# Patient Record
Sex: MALE | Race: WHITE | NOT HISPANIC OR LATINO
[De-identification: names, ages, dates, MRNs, and addresses within clinical notes are randomized per-mention and may not be internally consistent; named-entity substitution may affect disease eponyms.]

---

## 2017-05-25 PROBLEM — Z00.00 ENCOUNTER FOR PREVENTIVE HEALTH EXAMINATION: Status: ACTIVE | Noted: 2017-05-25

## 2017-06-13 ENCOUNTER — APPOINTMENT (OUTPATIENT)
Dept: HEMATOLOGY ONCOLOGY | Facility: CLINIC | Age: 62
End: 2017-06-13

## 2017-06-13 VITALS
OXYGEN SATURATION: 98 % | RESPIRATION RATE: 16 BRPM | DIASTOLIC BLOOD PRESSURE: 75 MMHG | TEMPERATURE: 98.7 F | HEIGHT: 68.9 IN | BODY MASS INDEX: 26.36 KG/M2 | HEART RATE: 63 BPM | WEIGHT: 178 LBS | SYSTOLIC BLOOD PRESSURE: 105 MMHG

## 2017-06-13 DIAGNOSIS — Z78.9 OTHER SPECIFIED HEALTH STATUS: ICD-10-CM

## 2017-06-13 DIAGNOSIS — Z80.2 FAMILY HISTORY OF MALIGNANT NEOPLASM OF OTHER RESPIRATORY AND INTRATHORACIC ORGANS: ICD-10-CM

## 2017-06-13 DIAGNOSIS — Z80.49 FAMILY HISTORY OF MALIGNANT NEOPLASM OF OTHER GENITAL ORGANS: ICD-10-CM

## 2017-06-13 RX ORDER — ASPIRIN ENTERIC COATED TABLETS 81 MG 81 MG/1
81 TABLET, DELAYED RELEASE ORAL
Refills: 0 | Status: ACTIVE | COMMUNITY

## 2017-06-16 ENCOUNTER — OTHER (OUTPATIENT)
Age: 62
End: 2017-06-16

## 2017-06-16 DIAGNOSIS — Z01.818 ENCOUNTER FOR OTHER PREPROCEDURAL EXAMINATION: ICD-10-CM

## 2017-06-27 ENCOUNTER — OTHER (OUTPATIENT)
Age: 62
End: 2017-06-27

## 2017-07-24 ENCOUNTER — OTHER (OUTPATIENT)
Age: 62
End: 2017-07-24

## 2017-11-09 ENCOUNTER — APPOINTMENT (OUTPATIENT)
Dept: HEMATOLOGY ONCOLOGY | Facility: CLINIC | Age: 62
End: 2017-11-09
Payer: COMMERCIAL

## 2017-11-09 PROCEDURE — 99499A: CUSTOM | Mod: NC

## 2018-03-12 ENCOUNTER — OTHER (OUTPATIENT)
Age: 63
End: 2018-03-12

## 2018-04-05 ENCOUNTER — APPOINTMENT (OUTPATIENT)
Dept: HEMATOLOGY ONCOLOGY | Facility: CLINIC | Age: 63
End: 2018-04-05
Payer: COMMERCIAL

## 2018-04-05 VITALS
SYSTOLIC BLOOD PRESSURE: 132 MMHG | RESPIRATION RATE: 20 BRPM | HEART RATE: 94 BPM | OXYGEN SATURATION: 98 % | TEMPERATURE: 98.5 F | HEIGHT: 68.9 IN | BODY MASS INDEX: 26.66 KG/M2 | DIASTOLIC BLOOD PRESSURE: 82 MMHG | WEIGHT: 179.99 LBS

## 2018-04-05 PROCEDURE — 99214 OFFICE O/P EST MOD 30 MIN: CPT

## 2018-04-05 RX ORDER — MENTHOL/CAMPHOR 0.5 %-0.5%
1000 LOTION (ML) TOPICAL
Refills: 0 | Status: ACTIVE | COMMUNITY

## 2018-04-05 RX ORDER — PSYLLIUM SEED
28.3 PACKET (EA) ORAL
Refills: 0 | Status: ACTIVE | COMMUNITY

## 2018-08-15 ENCOUNTER — OTHER (OUTPATIENT)
Age: 63
End: 2018-08-15

## 2018-10-03 ENCOUNTER — APPOINTMENT (OUTPATIENT)
Dept: HEMATOLOGY ONCOLOGY | Facility: CLINIC | Age: 63
End: 2018-10-03
Payer: COMMERCIAL

## 2018-10-03 ENCOUNTER — RESULT REVIEW (OUTPATIENT)
Age: 63
End: 2018-10-03

## 2018-10-03 VITALS
RESPIRATION RATE: 20 BRPM | HEART RATE: 70 BPM | SYSTOLIC BLOOD PRESSURE: 137 MMHG | BODY MASS INDEX: 27.1 KG/M2 | OXYGEN SATURATION: 98 % | WEIGHT: 182.98 LBS | HEIGHT: 68.9 IN | DIASTOLIC BLOOD PRESSURE: 74 MMHG | TEMPERATURE: 98.7 F

## 2018-10-03 PROCEDURE — 99214 OFFICE O/P EST MOD 30 MIN: CPT

## 2019-06-20 ENCOUNTER — APPOINTMENT (OUTPATIENT)
Dept: HEMATOLOGY ONCOLOGY | Facility: CLINIC | Age: 64
End: 2019-06-20
Payer: COMMERCIAL

## 2019-06-25 ENCOUNTER — RESULT REVIEW (OUTPATIENT)
Age: 64
End: 2019-06-25

## 2019-06-25 ENCOUNTER — APPOINTMENT (OUTPATIENT)
Dept: HEMATOLOGY ONCOLOGY | Facility: CLINIC | Age: 64
End: 2019-06-25
Payer: COMMERCIAL

## 2019-06-25 VITALS
RESPIRATION RATE: 18 BRPM | DIASTOLIC BLOOD PRESSURE: 87 MMHG | OXYGEN SATURATION: 96 % | SYSTOLIC BLOOD PRESSURE: 138 MMHG | HEART RATE: 75 BPM | HEIGHT: 68.94 IN | WEIGHT: 180 LBS | BODY MASS INDEX: 26.66 KG/M2 | TEMPERATURE: 98.9 F

## 2019-06-25 DIAGNOSIS — E88.09 OTHER DISORDERS OF PLASMA-PROTEIN METABOLISM, NOT ELSEWHERE CLASSIFIED: ICD-10-CM

## 2019-06-25 PROCEDURE — 99214 OFFICE O/P EST MOD 30 MIN: CPT

## 2019-06-25 RX ORDER — MULTIVITAMIN
TABLET ORAL
Refills: 0 | Status: COMPLETED | COMMUNITY
End: 2019-06-25

## 2019-06-25 RX ORDER — SENNOSIDES 8.6 MG TABLETS 8.6 MG/1
8.6 TABLET ORAL
Refills: 0 | Status: COMPLETED | COMMUNITY
End: 2019-06-25

## 2019-06-25 NOTE — HISTORY OF PRESENT ILLNESS
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 Pt.  S/P 7 of 12 cycles of FOLFOX has a PMH of sigmoid colon ca who was initially thought to have diverticulitis., and underwent a Tania's procedure with colostomy and then a colostomy takedown at [facility] protected by an ileostomy. He did not complete his chemo regimen due to complications with neuropathy. He underwent Gastrografin enema which showed high grade stricture. Unclear if ileostomy was reversed at this time last records are from 2016. Labs dated 05/11/2017 show Ca 125: 10.5, CEA: 3.1, Ca 19-9: 17, PSA 2.0  Most recent cat scan was in Dec of 2016 was negative.  He also has yearly colonoscopy and rigid scope every six months.\par he reports that he continues to have neuropathy in his upper and lower extremities but it is improved since discontinuing chemotherapy.  he also reports significant hearing loss. [FreeTextEntry1] : s/p Folfox x 3 m [de-identified] : Patient presents today for follow up of colon cancer- states he is feeling well- having colonoscopy next month.  CT scan 12/2018- stable CT since April 2018- no evidence neoplasm or metastatic disease within the abdomen or pelvis.

## 2019-06-25 NOTE — CONSULT LETTER
[FreeTextEntry3] : Lawanda Fuentes MD\par [facility] [place] at [facility]\par

## 2019-06-25 NOTE — DISCUSSION/SUMMARY
[FreeTextEntry1] : Patient called with results of labs, \par 1.communicated the results consistent with monoclonal gammopathy of uncertain significance. Recommended 24-hour urine collection for protein and urine protein electrophoresis and urine for Bence Chaney and skeletal survey.\par 2.noted elevation in CEA from 1.8 in January to 3.1 in May and now 4.4.  recommended to repeat CEA in 6 weeks. Recent CT scan negative. If trending up CEA will follow up with PETCT\par -

## 2019-06-25 NOTE — ASSESSMENT
[FreeTextEntry1] :  62 year old male with H/O stage IIIC (T3N2b) colon ca resected in 7/2015 and 7 of 12 cycles of FOLFOX\par  \par - proctoscopy with his surgeon in view of stricture- negative \par - live style modifications including data for ASA and vit D prophylaxis\par - MGUS We went over the implications of positive immunofixation and its range of clinical presentation from MGUS to multiple myeloma, lymphoma or amyloidosis and relation to osteoporosis.\par -Skeletal survey - negative \par -Mild neuropathy - attributed to use of oxaliplatin. Improving with time, tactile and proprioception. \par - Dec 2018 - last CT scan, due in August 2019 \par - Colonoscopy q 1.5 years \par \par \par

## 2019-06-25 NOTE — REVIEW OF SYSTEMS
[Recent Change In Weight] : ~T no recent weight change [Fever] : no fever [Eye Pain] : no eye pain [Loss of Hearing] : no loss of hearing [Vision Problems] : no vision problems [Lower Ext Edema] : no lower extremity edema [Chest Pain] : no chest pain [Cough] : no cough [SOB on Exertion] : no shortness of breath during exertion [Abdominal Pain] : no abdominal pain [Diarrhea] : no diarrhea [Constipation] : no constipation [Joint Pain] : no joint pain [Dysuria] : no dysuria [Muscle Pain] : no muscle pain [Insomnia] : no insomnia [Dizziness] : no dizziness [Skin Rash] : no skin rash [Depression] : no depression [Anxiety] : no anxiety [Easy Bleeding] : no tendency for easy bleeding [Muscle Weakness] : no muscle weakness [Hot Flashes] : no hot flashes [Easy Bruising] : no tendency for easy bruising [de-identified] : Neuropathy upper and lower extremities.- improved

## 2019-09-03 ENCOUNTER — OTHER (OUTPATIENT)
Age: 64
End: 2019-09-03

## 2019-09-25 ENCOUNTER — OTHER (OUTPATIENT)
Age: 64
End: 2019-09-25

## 2019-11-12 ENCOUNTER — RESULT REVIEW (OUTPATIENT)
Age: 64
End: 2019-11-12

## 2019-11-12 ENCOUNTER — APPOINTMENT (OUTPATIENT)
Dept: HEMATOLOGY ONCOLOGY | Facility: CLINIC | Age: 64
End: 2019-11-12
Payer: COMMERCIAL

## 2019-11-12 VITALS
WEIGHT: 184.06 LBS | DIASTOLIC BLOOD PRESSURE: 80 MMHG | BODY MASS INDEX: 27.26 KG/M2 | OXYGEN SATURATION: 97 % | SYSTOLIC BLOOD PRESSURE: 130 MMHG | HEART RATE: 68 BPM | HEIGHT: 68.94 IN | RESPIRATION RATE: 20 BRPM | TEMPERATURE: 98.5 F

## 2019-11-12 PROCEDURE — 99214 OFFICE O/P EST MOD 30 MIN: CPT

## 2019-11-12 RX ORDER — STANDARDIZED SENNA CONCENTRATE 8.6 MG/1
TABLET ORAL
Refills: 0 | Status: ACTIVE | COMMUNITY

## 2019-11-12 NOTE — HISTORY OF PRESENT ILLNESS
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 Pt.  S/P 7 of 12 cycles of FOLFOX has a PMH of sigmoid colon ca who was initially thought to have diverticulitis., and underwent a Tania's procedure with colostomy and then a colostomy takedown at [facility] protected by an ileostomy. He did not complete his chemo regimen due to complications with neuropathy. He underwent Gastrografin enema which showed high grade stricture. Unclear if ileostomy was reversed at this time last records are from 2016. Labs dated 05/11/2017 show Ca 125: 10.5, CEA: 3.1, Ca 19-9: 17, PSA 2.0  Most recent cat scan was in Dec of 2016 was negative.  He also has yearly colonoscopy and rigid scope every six months.\par he reports that he continues to have neuropathy in his upper and lower extremities but it is improved since discontinuing chemotherapy.  he also reports significant hearing loss. [FreeTextEntry1] : s/p Folfox x 3 m [de-identified] : Patient presents today for follow up of colon cancer- states he is feeling well- having colonoscopy at the end of this month before Thanksgiving.  CT scan done in October 2019 in [facility].

## 2019-11-12 NOTE — CONSULT LETTER
[Dear  ___] : Dear  [unfilled], [Consult Letter:] : I had the pleasure of evaluating your patient, [unfilled]. [Please see my note below.] : Please see my note below. [Consult Closing:] : Thank you very much for allowing me to participate in the care of this patient.  If you have any questions, please do not hesitate to contact me. [Sincerely,] : Sincerely, [FreeTextEntry3] : Lawanda Fuentes MD\par [facility] [place] at [facility]\par

## 2019-11-12 NOTE — REVIEW OF SYSTEMS
[Fatigue] : fatigue [Negative] : Allergic/Immunologic [Fever] : no fever [Recent Change In Weight] : ~T no recent weight change [Eye Pain] : no eye pain [Loss of Hearing] : no loss of hearing [Vision Problems] : no vision problems [Lower Ext Edema] : no lower extremity edema [Chest Pain] : no chest pain [SOB on Exertion] : no shortness of breath during exertion [Cough] : no cough [Abdominal Pain] : no abdominal pain [Constipation] : no constipation [Diarrhea] : no diarrhea [Dysuria] : no dysuria [Joint Pain] : no joint pain [Muscle Pain] : no muscle pain [Skin Rash] : no skin rash [Dizziness] : no dizziness [Insomnia] : no insomnia [Anxiety] : no anxiety [Hot Flashes] : no hot flashes [Depression] : no depression [Muscle Weakness] : no muscle weakness [Easy Bruising] : no tendency for easy bruising [Easy Bleeding] : no tendency for easy bleeding [de-identified] : Neuropathy upper and lower extremities.- improved

## 2020-06-23 ENCOUNTER — RESULT REVIEW (OUTPATIENT)
Age: 65
End: 2020-06-23

## 2020-06-23 ENCOUNTER — APPOINTMENT (OUTPATIENT)
Dept: HEMATOLOGY ONCOLOGY | Facility: CLINIC | Age: 65
End: 2020-06-23
Payer: COMMERCIAL

## 2020-06-23 VITALS
TEMPERATURE: 97.9 F | BODY MASS INDEX: 27.3 KG/M2 | HEART RATE: 65 BPM | OXYGEN SATURATION: 97 % | SYSTOLIC BLOOD PRESSURE: 125 MMHG | DIASTOLIC BLOOD PRESSURE: 71 MMHG | HEIGHT: 68.94 IN | WEIGHT: 184.3 LBS | RESPIRATION RATE: 18 BRPM

## 2020-06-23 PROCEDURE — 99214 OFFICE O/P EST MOD 30 MIN: CPT

## 2020-06-23 NOTE — HISTORY OF PRESENT ILLNESS
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 Pt.  S/P 7 of 12 cycles of FOLFOX has a PMH of sigmoid colon ca who was initially thought to have diverticulitis., and underwent a Tania's procedure with colostomy and then a colostomy takedown at [facility] protected by an ileostomy. He did not complete his chemo regimen due to complications with neuropathy. He underwent Gastrografin enema which showed high grade stricture. Unclear if ileostomy was reversed at this time last records are from 2016. Labs dated 05/11/2017 show Ca 125: 10.5, CEA: 3.1, Ca 19-9: 17, PSA 2.0  Most recent cat scan was in Dec of 2016 was negative.  He also has yearly colonoscopy and rigid scope every six months.\par he reports that he continues to have neuropathy in his upper and lower extremities but it is improved since discontinuing chemotherapy.  he also reports significant hearing loss. [de-identified] : Patient presents today for follow up of colon cancer- states he is feeling well- having colonoscopy at the end of this month before Thanksgiving.  CT scan done in October 2019 in [facility].

## 2020-06-23 NOTE — REVIEW OF SYSTEMS
[Fatigue] : fatigue [Negative] : Allergic/Immunologic [Fever] : no fever [Recent Change In Weight] : ~T no recent weight change [Loss of Hearing] : no loss of hearing [Vision Problems] : no vision problems [Eye Pain] : no eye pain [Chest Pain] : no chest pain [Cough] : no cough [Lower Ext Edema] : no lower extremity edema [SOB on Exertion] : no shortness of breath during exertion [Abdominal Pain] : no abdominal pain [Diarrhea] : no diarrhea [Constipation] : no constipation [Dysuria] : no dysuria [Muscle Pain] : no muscle pain [Joint Pain] : no joint pain [Skin Rash] : no skin rash [Insomnia] : no insomnia [Dizziness] : no dizziness [Anxiety] : no anxiety [Depression] : no depression [Hot Flashes] : no hot flashes [Muscle Weakness] : no muscle weakness [Easy Bleeding] : no tendency for easy bleeding [Easy Bruising] : no tendency for easy bruising [de-identified] : Neuropathy upper and lower extremities.- improved

## 2020-06-23 NOTE — CONSULT LETTER
[Dear  ___] : Dear  [unfilled], [Consult Letter:] : I had the pleasure of evaluating your patient, [unfilled]. [Please see my note below.] : Please see my note below. [Sincerely,] : Sincerely, [Consult Closing:] : Thank you very much for allowing me to participate in the care of this patient.  If you have any questions, please do not hesitate to contact me. [FreeTextEntry3] : Lawanda Fuentes MD\par [facility] [place] at [facility]\par

## 2020-06-23 NOTE — ASSESSMENT
[FreeTextEntry1] :  64 year old male with H/O stage IIIC (T3N2b) colon ca resected in 7/2015 and 7 of 12 cycles of FOLFOX\par  \par - proctoscopy with his surgeon in view of stricture- negative \par - live style modifications including data for ASA and vit D prophylaxis\par - MGUS positive immunofixation and its range of clinical presentation from MGUS to multiple myeloma, lymphoma or amyloidosis and relation to osteoporosis.\par -Skeletal survey - negative \par -Mild neuropathy - attributed to use of oxaliplatin. Improving with time, tactile and proprioception. \par - Dec 2018 - last CT scan, due in August 2019 \par - Colonoscopy q 2-3 years\par - check PSA, vit D, vit B12 \par \par leiomyosarcoma- Mother \par NHL - Father  \par \par \par

## 2020-12-15 ENCOUNTER — APPOINTMENT (OUTPATIENT)
Dept: HEMATOLOGY ONCOLOGY | Facility: CLINIC | Age: 65
End: 2020-12-15

## 2021-05-04 ENCOUNTER — APPOINTMENT (OUTPATIENT)
Dept: HEMATOLOGY ONCOLOGY | Facility: CLINIC | Age: 66
End: 2021-05-04
Payer: MEDICARE

## 2021-05-18 ENCOUNTER — APPOINTMENT (OUTPATIENT)
Dept: HEMATOLOGY ONCOLOGY | Facility: CLINIC | Age: 66
End: 2021-05-18
Payer: MEDICARE

## 2021-05-18 ENCOUNTER — RESULT REVIEW (OUTPATIENT)
Age: 66
End: 2021-05-18

## 2021-05-18 VITALS
DIASTOLIC BLOOD PRESSURE: 75 MMHG | WEIGHT: 184 LBS | OXYGEN SATURATION: 97 % | SYSTOLIC BLOOD PRESSURE: 125 MMHG | BODY MASS INDEX: 27.25 KG/M2 | RESPIRATION RATE: 18 BRPM | HEIGHT: 68.94 IN | HEART RATE: 84 BPM | TEMPERATURE: 97.3 F

## 2021-05-18 PROCEDURE — 99214 OFFICE O/P EST MOD 30 MIN: CPT

## 2021-05-18 NOTE — HISTORY OF PRESENT ILLNESS
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 Pt.  S/P 7 of 12 cycles of FOLFOX has a PMH of sigmoid colon ca who was initially thought to have diverticulitis., and underwent a Tania's procedure with colostomy and then a colostomy takedown at [facility] protected by an ileostomy. He did not complete his chemo regimen due to complications with neuropathy. He underwent Gastrografin enema which showed high grade stricture. Unclear if ileostomy was reversed at this time last records are from 2016. Labs dated 05/11/2017 show Ca 125: 10.5, CEA: 3.1, Ca 19-9: 17, PSA 2.0  Most recent cat scan was in Dec of 2016 was negative.  He also has yearly colonoscopy and rigid scope every six months.\par he reports that he continues to have neuropathy in his upper and lower extremities but it is improved since discontinuing chemotherapy.  he also reports significant hearing loss. [FreeTextEntry1] : s/p Folfox x 3 m [de-identified] : Patient presents today for follow up of colon cancer. Patient states that he is feeling "great." He states that his colonoscopy went 'very well' and there was ERIC.

## 2021-05-18 NOTE — REVIEW OF SYSTEMS
[Negative] : Cardiovascular [Fever] : no fever [Recent Change In Weight] : ~T no recent weight change [Eye Pain] : no eye pain [Vision Problems] : no vision problems [Loss of Hearing] : no loss of hearing [Chest Pain] : no chest pain [Lower Ext Edema] : no lower extremity edema [Cough] : no cough [SOB on Exertion] : no shortness of breath during exertion [Abdominal Pain] : no abdominal pain [Constipation] : no constipation [Diarrhea] : no diarrhea [Dysuria] : no dysuria [Joint Pain] : no joint pain [Muscle Pain] : no muscle pain [Skin Rash] : no skin rash [Dizziness] : no dizziness [Insomnia] : no insomnia [Anxiety] : no anxiety [Depression] : no depression [Hot Flashes] : no hot flashes [Muscle Weakness] : no muscle weakness [Easy Bleeding] : no tendency for easy bleeding [Easy Bruising] : no tendency for easy bruising [de-identified] : Neuropathy upper and lower extremities- improved

## 2021-05-18 NOTE — ASSESSMENT
[FreeTextEntry1] :  65 year old male with H/O stage IIIC (T3N2b) colon ca resected in 7/2015 and 7 of 12 cycles of FOLFOX\par  \par - proctoscopy with his surgeon in view of stricture- negative \par - live style modifications including data for ASA and vit D prophylaxis\par - MGUS positive immunofixation and its range of clinical presentation from MGUS to multiple myeloma, lymphoma or amyloidosis and relation to osteoporosis.\par -Skeletal survey - negative \par -Mild neuropathy - attributed to use of oxaliplatin. Improving with time, tactile and proprioception. \par - Dec 2018 - last CT scan, due in August 2019 \par - Colonoscopy q 2-3 years\par - check PSA, vit D, vit B12 \par \par 5/18/2021\par CT and Colonoscopy - ERIC \par Patient feels well, no complains.\par vit D - 5000 \par ASA 81 mg\par Neuropathy- no improvement, dropping the things \par \par \par \par

## 2022-01-24 NOTE — ASSESSMENT
[FreeTextEntry1] :  62 year old male with H/O stage IIIC (T3N2b) colon ca resected in 7/2015 and 7 of 12 cycles of FOLFOX\par  \par - proctoscopy with his surgeon in view of stricture- negative \par - live style modifications including data for ASA and vit D prophylaxis\par - MGUS positive immunofixation and its range of clinical presentation from MGUS to multiple myeloma, lymphoma or amyloidosis and relation to osteoporosis.\par -Skeletal survey - negative \par -Mild neuropathy - attributed to use of oxaliplatin. Improving with time, tactile and proprioception. \par - Dec 2018 - last CT scan, due in August 2019 \par - Colonoscopy q 2-3 years\par - check PSA, vit D, vit B12 \par \par \par  Urine Pregnancy Test Result: negative

## 2022-07-28 ENCOUNTER — APPOINTMENT (OUTPATIENT)
Dept: HEMATOLOGY ONCOLOGY | Facility: CLINIC | Age: 67
End: 2022-07-28
Payer: MEDICARE

## 2022-07-28 DIAGNOSIS — D47.2 MONOCLONAL GAMMOPATHY: ICD-10-CM

## 2022-07-28 DIAGNOSIS — C18.9 MALIGNANT NEOPLASM OF COLON, UNSPECIFIED: ICD-10-CM

## 2022-07-28 DIAGNOSIS — D51.9 VITAMIN B12 DEFICIENCY ANEMIA, UNSPECIFIED: ICD-10-CM

## 2022-07-28 DIAGNOSIS — N18.31 CHRONIC KIDNEY DISEASE, STAGE 3A: ICD-10-CM

## 2022-07-28 PROCEDURE — 99214 OFFICE O/P EST MOD 30 MIN: CPT | Mod: 25,95

## 2022-07-28 NOTE — REVIEW OF SYSTEMS
[Negative] : Allergic/Immunologic [Fever] : no fever [Recent Change In Weight] : ~T no recent weight change [Eye Pain] : no eye pain [Vision Problems] : no vision problems [Loss of Hearing] : no loss of hearing [Chest Pain] : no chest pain [Lower Ext Edema] : no lower extremity edema [Cough] : no cough [SOB on Exertion] : no shortness of breath during exertion [Abdominal Pain] : no abdominal pain [Constipation] : no constipation [Diarrhea] : no diarrhea [Dysuria] : no dysuria [Joint Pain] : no joint pain [Muscle Pain] : no muscle pain [Skin Rash] : no skin rash [Dizziness] : no dizziness [Insomnia] : no insomnia [Anxiety] : no anxiety [Depression] : no depression [Hot Flashes] : no hot flashes [Muscle Weakness] : no muscle weakness [Easy Bleeding] : no tendency for easy bleeding [Easy Bruising] : no tendency for easy bruising [de-identified] : Neuropathy upper and lower extremities- improved

## 2022-07-28 NOTE — HISTORY OF PRESENT ILLNESS
[Home] : at home, [unfilled] , at the time of the visit. 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 Pt.  S/P 7 of 12 cycles of FOLFOX has a PMH of sigmoid colon ca who was initially thought to have diverticulitis., and underwent a Tania's procedure with colostomy and then a colostomy takedown at [facility] protected by an ileostomy. He did not complete his chemo regimen due to complications with neuropathy. He underwent Gastrografin enema which showed high grade stricture. Unclear if ileostomy was reversed at this time last records are from 2016. Labs dated 05/11/2017 show Ca 125: 10.5, CEA: 3.1, Ca 19-9: 17, PSA 2.0  Most recent cat scan was in Dec of 2016 was negative.  He also has yearly colonoscopy and rigid scope every six months.\par he reports that he continues to have neuropathy in his upper and lower extremities but it is improved since discontinuing chemotherapy.  he also reports significant hearing loss. [FreeTextEntry1] : s/p Folfox x 3 m [de-identified] : Patient presents today for follow up of colon cancer. He had recently episode of abdominal pain and went to ER>

## 2022-07-28 NOTE — ASSESSMENT
[FreeTextEntry1] :  66 year old male with H/O stage IIIC (T3N2b) colon ca resected in 7/2015 and 7 of 12 cycles of FOLFOX\par  \par - proctoscopy with his surgeon in view of stricture- negative \par - live style modifications including data for ASA and vit D prophylaxis\par - MGUS positive immunofixation and its range of clinical presentation from MGUS to multiple myeloma, lymphoma or amyloidosis and relation to osteoporosis.\par -Skeletal survey - negative 2020\par -Mild neuropathy - attributed to use of oxaliplatin. Improving with time, tactile and proprioception. \par - CT scan- 7/22/2022 fatty liver with cyst, fecal retention. Reviewed with patient to increase hydration.\par - CEA 2.5 7/22/022\par - CKD - EGFR 1.32, BUN 18. BP WNL.  Reviewed increased hydration. \par - Colonoscopy q 2-3 years\par - check PSA, vit D, vit B12 \par - polyneuropathy- no improvement, dropping the things \par \par Labs and CT scan reviewed.  \par Additional labs ordered in view of new CKD and MGUS\par \par \par \par

## 2022-07-28 NOTE — CONSULT LETTER
[Dear  ___] : Dear  [unfilled], [Consult Letter:] : I had the pleasure of evaluating your patient, [unfilled]. [Please see my note below.] : Please see my note below. [Consult Closing:] : Thank you very much for allowing me to participate in the care of this patient.  If you have any questions, please do not hesitate to contact me. [Sincerely,] : Sincerely, [FreeTextEntry3] : Lawanda Fuentes MD\par [facility] [place] at [facility]\par

## 2024-10-07 NOTE — DISCUSSION/SUMMARY
[FreeTextEntry1] : Patient called with results of labs, \par 1.communicated the results consistent with monoclonal gammopathy of uncertain significance. Recommended 24-hour urine collection for protein and urine protein electrophoresis and urine for Bence Chaney and skeletal survey.\par 2.noted elevation in CEA from 1.8 in January to 3.1 in May and now 4.4.  recommended to repeat CEA in 6 weeks. Recent CT scan negative. If trending up CEA will follow up with PETCT\par -  PROVIDER:[TOKEN:[[id number]],FOLLOWUP:[1 month]],PROVIDER:[TOKEN:[[id number]],FOLLOWUP:[1 month]]